# Patient Record
Sex: FEMALE | Race: OTHER
[De-identification: names, ages, dates, MRNs, and addresses within clinical notes are randomized per-mention and may not be internally consistent; named-entity substitution may affect disease eponyms.]

---

## 2018-04-14 ENCOUNTER — HOSPITAL ENCOUNTER (OUTPATIENT)
Dept: HOSPITAL 76 - DI | Age: 28
Discharge: HOME | End: 2018-04-14
Attending: PHYSICIAN ASSISTANT
Payer: COMMERCIAL

## 2018-04-14 DIAGNOSIS — R51: ICD-10-CM

## 2018-04-14 DIAGNOSIS — D33.2: Primary | ICD-10-CM

## 2018-04-14 PROCEDURE — 70553 MRI BRAIN STEM W/O & W/DYE: CPT

## 2018-04-15 NOTE — MRI REPORT
EXAM:

MRI BRAIN WITH AND WITHOUT CONTRAST

 

COMPARISON: 

None.

 

CLINICAL HISTORY: 

Benign neoplasm of brain.

 

TECHNIQUE: 

Multiplanar multisequence imaging is performed through the head with and without 7.5 mL Gadavist.

 

FINDINGS:

Diffusion-weighted imaging shows no acute infarct.

 

Gradient sequence shows no evident prior parenchymal hemorrhage.

 

T2 FLAIR imaging shows minimal white matter T2 prolongation, no masses.

 

Ventricular size is normal.

 

T2 spin-echo imaging shows no posterior fossa masses. No prior posterior fossa infarct.

 

Vascular flow voids are unremarkable. IACs are unremarkable. Fifth nerves are unremarkable.

 

T1 precontrast 3-dimentional spoiled gradient sequence shows no abnormal elevated parenchymal T1 sign
al. No developmental anomalies.

 

Postcontrast T1 three-dimensional spoiled gradient sequence shows no abnormal parenchymal enhancement
. No masses.

 

Visualized orbits, paranasal sinuses and mastoids are unremarkable. No calvarial signal abnormality.

 

Pituitary fossa, clivus and foramen magnum are unremarkable.

 

IMPRESSION:

Normal brain MRI. 

Referring Provider Line: [phone]

 

SITE ID: [id number]

## 2020-09-23 ENCOUNTER — HOSPITAL ENCOUNTER (EMERGENCY)
Age: 30
Discharge: HOME | End: 2020-09-23
Payer: OTHER GOVERNMENT

## 2020-09-23 VITALS
OXYGEN SATURATION: 100 % | DIASTOLIC BLOOD PRESSURE: 71 MMHG | HEART RATE: 87 BPM | SYSTOLIC BLOOD PRESSURE: 113 MMHG | RESPIRATION RATE: 16 BRPM

## 2020-09-23 VITALS
RESPIRATION RATE: 16 BRPM | DIASTOLIC BLOOD PRESSURE: 86 MMHG | HEART RATE: 93 BPM | OXYGEN SATURATION: 97 % | TEMPERATURE: 98.6 F | SYSTOLIC BLOOD PRESSURE: 122 MMHG

## 2020-09-23 VITALS
RESPIRATION RATE: 14 BRPM | DIASTOLIC BLOOD PRESSURE: 74 MMHG | SYSTOLIC BLOOD PRESSURE: 112 MMHG | OXYGEN SATURATION: 100 % | HEART RATE: 78 BPM

## 2020-09-23 DIAGNOSIS — S60.221A: Primary | ICD-10-CM
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
## 2020-09-23 PROCEDURE — 99283 EMERGENCY DEPT VISIT LOW MDM: CPT

## 2020-09-23 PROCEDURE — 73130 X-RAY EXAM OF HAND: CPT

## 2020-09-23 NOTE — DI.RAD.S_ITS
PROCEDURE:  XR HAND RT MIN 3V  
   
INDICATIONS:  injury  
   
TECHNIQUE:  3 views of the hand(s) acquired.    
   
COMPARISON:  None.  
   
FINDINGS:    
   
Bones:  No fractures or dislocations.  Carpal bones are normally aligned.  No suspicious   
bony lesions.    
   
Soft tissues:  No suspicious soft tissue calcifications.    
   
IMPRESSION:    
   
1. No fracture or dislocation.    
   
   
Dictated by: Kevin Sanchez M.D. on 9/23/2020 at 18:01       
Approved by: Kevin Sanchez M.D. on 9/23/2020 at 18:01

## 2020-09-23 NOTE — ED_ITS
"HPI - Extremity Injury (Upper)    
<MANAS Boo - Last Filed: 09/23/20 22:39>    
General    
Chief Complaint: Extremity Injury, Upper    
Stated Complaint: thinks she broke her right hand    
Time Seen by Provider: 09/23/20 17:05    
Source: patient    
Mode of arrival: Ambulatory    
Limitations: no limitations    
History of Present Illness    
HPI narrative: This is a 29-year-old female, nonsmoker, who has noncontributing   
history presents to ED with chief complain of dominant hand right index, long   
finger, ring mid to proximal finger pain with distal metacarpal.  She injured   
yesterday the affected hand by slamming a car door when her hand was in between   
the car and the door.  Patient denies previous injury or fractures to affected   
hand.  Patient states pain is tolerable rates as 3 to 4/10 but feels more stiff   
today.  There is light coloring bruise on proximal long finger.  Patient reports  
intact sensation and is able to move all her fingers without difficulty.    
Related Data    
                                Home Medications    
    
    
    
 Medication  Instructions  Recorded  Confirmed    
     
nortriptyline 10 mg PO DAILY 09/23/20 09/23/20    
     
sumatriptan succinate 100 mg PO DAILY 09/23/20 09/23/20    
    
    
    
                                    Allergies    
    
    
    
Allergy/AdvReac Type Severity Reaction Status Date / Time    
     
No Known Drug Allergies Allergy   Verified 09/23/20 17:02    
    
    
    
    
Review of Systems    
<MANAS Boo - Last Filed: 09/23/20 22:39>    
Review of Systems    
Narrative: General: Denies fever, chills, fatigue, malaise, sweats.    
    
Respiratory: Denies dyspnea, cough, wheezing, hemoptysis, sputum.    
    
Cardiovascular: Denies chest pain, palpitations, orthopnea, edema.    
    
Musculoskeletal:  See HPI    
    
Skin:  See HPI    
    
Neurologic: Denies weakness, headache, numbness, change in speech, confusion,   
seizures, incoordination.    
    
Psychiatric: No concerning psychosocial issues.    
    
    
    
Patient History    
<MANAS Boo - Last Filed: 09/23/20 22:39>    
Medical History (Updated 09/23/20 @ 18:28 by MANAS Boo)    
    
Migraine headache (Acute)    
    
    
Surgical History (Updated 09/23/20 @ 17:47 by MANAS Boo)    
    
History of sinus surgery (Acute)    
    
    
Social History    
Smoking Status:  Never smoker     
    
    
Smoking Status: Never smoker    
alcohol intake frequency: holidays/special occasions only    
Substance Use Type: marijuana    
    
Exam    
<Catracho MANAS Asher - Last Filed: 09/23/20 22:39>    
Narrative    
Exam Narrative: General appearance: well developed, well nourished, in no acute   
distress.    
    
Head: normocephalic, atraumatic, no scalp lesions, non-tender.    
    
ENT: Hearing grossly intact.  Airway patent.    
    
Neck/Thyroid: neck supple, full range of motion, no visible masses or meningeal   
signs. No JVD, non-tender without lymphadenopathy.     
    
Skin:  Light ecchymosis to proximal phalange of right long finger.  No   
suspicious rashes, lesions over visible areas.  Warm and dry and appropriate   
color for ethnicity.    
    
Heart: no clubbing, no cyanosis, no edema.     
    
Lungs: Breathing even and unlabored.  No stridor.  No accessory muscles used.    
Able to speak in full sentences.     
    
Chest: normal shape and expansion.    
    
Abdomen: non-obese, non-distended.    
    
Neurologic: alert and oriented.  Cognitive exam, CNS and PNS grossly intact on   
informal exam.    
    
Psych:  good eye contact, normal affect.      
    
Initial Vital Signs    
Initial Vital Signs: Vital Signs    
    
    
    
Temperature  98.6 F   09/23/20 17:00    
     
Pulse Rate  93 H  09/23/20 17:00    
     
Respiratory Rate  16   09/23/20 17:00    
     
Blood
[id number]|2020-09-23 17:43:28|2020-09-23 17:43:28|ED.[email or web address]||||"HPI - Extremity Injury (Upper)

## 2020-11-06 ENCOUNTER — HOSPITAL ENCOUNTER (OUTPATIENT)
Dept: HOSPITAL 76 - EMS | Age: 30
End: 2020-11-06
Attending: SURGERY
Payer: COMMERCIAL

## 2020-11-06 DIAGNOSIS — R05: Primary | ICD-10-CM

## 2020-11-06 DIAGNOSIS — J02.9: ICD-10-CM
